# Patient Record
Sex: FEMALE | URBAN - METROPOLITAN AREA
[De-identification: names, ages, dates, MRNs, and addresses within clinical notes are randomized per-mention and may not be internally consistent; named-entity substitution may affect disease eponyms.]

---

## 2018-12-12 ENCOUNTER — OFFICE VISIT (OUTPATIENT)
Dept: URBAN - METROPOLITAN AREA CLINIC 88 | Facility: CLINIC | Age: 83
End: 2018-12-12
Payer: MEDICARE

## 2018-12-12 DIAGNOSIS — H35.361 DRUSEN (DEGENERATIVE) OF MACULA, RIGHT EYE: ICD-10-CM

## 2018-12-12 DIAGNOSIS — H40.89 OTHER SPECIFIED GLAUCOMA: Primary | ICD-10-CM

## 2018-12-12 DIAGNOSIS — H34.8122 CENTRAL RETINAL VEIN OCCLUSION, LEFT EYE, STABLE: ICD-10-CM

## 2018-12-12 DIAGNOSIS — H21.269 IRIS ATROPHY: ICD-10-CM

## 2018-12-12 DIAGNOSIS — Z96.1 PRESENCE OF INTRAOCULAR LENS: ICD-10-CM

## 2018-12-12 DIAGNOSIS — H18.422 BAND KERATOPATHY, LEFT EYE: ICD-10-CM

## 2018-12-12 DIAGNOSIS — B02.39 OTHER HERPES ZOSTER EYE DISEASE: ICD-10-CM

## 2018-12-12 PROCEDURE — 92004 COMPRE OPH EXAM NEW PT 1/>: CPT | Performed by: OPHTHALMOLOGY

## 2018-12-12 ASSESSMENT — INTRAOCULAR PRESSURE
OS: 10
OD: 16

## 2018-12-12 NOTE — IMPRESSION/PLAN
Impression: Central retinal vein occlusion, left eye, stable: Z35.5656. OS. Plan: Hx of CRVO. Stable. Continue to monitor. Emphasized blood pressure control.

## 2018-12-12 NOTE — IMPRESSION/PLAN
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 Plan: Discussed diagnosis in detail with patient.  Continue to observe

## 2018-12-12 NOTE — IMPRESSION/PLAN
Impression: Drusen (degenerative) of macula, right eye: H35.361. Plan: Discussed diagnosis in detail with patient. Vitamins recommended.

## 2018-12-12 NOTE — IMPRESSION/PLAN
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 OS. Condition: stable. Plan: Discussed diagnosis in detail with patient. Continue on Atropine QD OS and Predforte TID OS.

## 2018-12-12 NOTE — IMPRESSION/PLAN
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 Condition: stable. Symptoms: IOP is stable. Plan: IOP stable. Will continue to monitor IOP.

## 2018-12-12 NOTE — IMPRESSION/PLAN
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 Condition: stable. Plan: Hx of Herpes Zoster. Continue to monitor.

## 2019-01-16 ENCOUNTER — OFFICE VISIT (OUTPATIENT)
Dept: URBAN - METROPOLITAN AREA CLINIC 88 | Facility: CLINIC | Age: 84
End: 2019-01-16
Payer: MEDICARE

## 2019-01-16 PROCEDURE — 99212 OFFICE O/P EST SF 10 MIN: CPT | Performed by: OPHTHALMOLOGY

## 2019-01-16 ASSESSMENT — INTRAOCULAR PRESSURE
OD: 19
OS: 12

## 2019-01-16 NOTE — IMPRESSION/PLAN
Impression: Band keratopathy, left eye: H18.422 OS. Condition: established, stable. Symptoms: will continue to monitor. Plan: Advised patient of condition. I advised patient to use decrease Predforte BID OS x 1 week, then QD x 2 weeks, then d/c. Atropine QD OS x2 weeks. Patient instructed to call if condition gets worse.

## 2019-01-16 NOTE — IMPRESSION/PLAN
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 Condition: stable. Symptoms: IOP is stable. Plan: IOP stable. Will continue to monitor IOP. Patient lives in [place]. Patient was advised to see her Eye MD in 2 months for IOP. Patient denies any pain.

## 2019-03-11 ENCOUNTER — OFFICE VISIT (OUTPATIENT)
Dept: URBAN - METROPOLITAN AREA CLINIC 88 | Facility: CLINIC | Age: 84
End: 2019-03-11
Payer: MEDICARE

## 2019-03-11 PROCEDURE — 99212 OFFICE O/P EST SF 10 MIN: CPT | Performed by: OPHTHALMOLOGY

## 2019-03-11 ASSESSMENT — INTRAOCULAR PRESSURE
OD: 19
OS: 13

## 2019-03-11 NOTE — IMPRESSION/PLAN
Impression: Other specified glaucoma: H40.89 OU. Plan: IOP stable. Patient is not using prescribed eye drops.  Patient needs to be monitored 2-3 months for IOP CK

## 2019-03-11 NOTE — IMPRESSION/PLAN
Impression: Band keratopathy, left eye: H18.422 OS. Plan: Advised patient of condition. Patient denies any pain. Patient is not using prescribed eye drops. I explained to the patient she needs to be monitored every 2-3 months.